# Patient Record
Sex: MALE | Race: WHITE | NOT HISPANIC OR LATINO | Employment: STUDENT | URBAN - METROPOLITAN AREA
[De-identification: names, ages, dates, MRNs, and addresses within clinical notes are randomized per-mention and may not be internally consistent; named-entity substitution may affect disease eponyms.]

---

## 2017-08-01 ENCOUNTER — ALLSCRIPTS OFFICE VISIT (OUTPATIENT)
Dept: OTHER | Facility: OTHER | Age: 22
End: 2017-08-01

## 2017-12-29 ENCOUNTER — ALLSCRIPTS OFFICE VISIT (OUTPATIENT)
Dept: OTHER | Facility: OTHER | Age: 22
End: 2017-12-29

## 2017-12-30 LAB
A/G RATIO (HISTORICAL): 1.7 (ref 1.2–2.2)
ALBUMIN SERPL BCP-MCNC: 4.6 G/DL (ref 3.5–5.5)
ALP SERPL-CCNC: 100 IU/L (ref 39–117)
ALT SERPL W P-5'-P-CCNC: 60 IU/L (ref 0–44)
AST SERPL W P-5'-P-CCNC: 32 IU/L (ref 0–40)
BILIRUB SERPL-MCNC: 1.2 MG/DL (ref 0–1.2)
BUN SERPL-MCNC: 12 MG/DL (ref 6–20)
BUN/CREA RATIO (HISTORICAL): 13 (ref 9–20)
CALCIUM SERPL-MCNC: 9.5 MG/DL (ref 8.7–10.2)
CHLORIDE SERPL-SCNC: 100 MMOL/L (ref 96–106)
CO2 SERPL-SCNC: 25 MMOL/L (ref 18–29)
CREAT SERPL-MCNC: 0.92 MG/DL (ref 0.76–1.27)
DEPRECATED RDW RBC AUTO: 12.8 % (ref 12.3–15.4)
EGFR AFRICAN AMERICAN (HISTORICAL): 137 ML/MIN/1.73
EGFR-AMERICAN CALC (HISTORICAL): 118 ML/MIN/1.73
GLUCOSE SERPL-MCNC: 81 MG/DL (ref 65–99)
HCT VFR BLD AUTO: 47 % (ref 37.5–51)
HGB BLD-MCNC: 16.6 G/DL (ref 13–17.7)
MCH RBC QN AUTO: 31.4 PG (ref 26.6–33)
MCHC RBC AUTO-ENTMCNC: 35.3 G/DL (ref 31.5–35.7)
MCV RBC AUTO: 89 FL (ref 79–97)
PLATELET # BLD AUTO: 242 X10E3/UL (ref 150–379)
POTASSIUM SERPL-SCNC: 3.9 MMOL/L (ref 3.5–5.2)
RBC (HISTORICAL): 5.29 X10E6/UL (ref 4.14–5.8)
SODIUM SERPL-SCNC: 139 MMOL/L (ref 134–144)
TOT. GLOBULIN, SERUM (HISTORICAL): 2.7 G/DL (ref 1.5–4.5)
TOTAL PROTEIN (HISTORICAL): 7.3 G/DL (ref 6–8.5)
WBC # BLD AUTO: 8.2 X10E3/UL (ref 3.4–10.8)

## 2017-12-30 NOTE — PROGRESS NOTES
Assessment   1  Nausea with vomiting (787 01) (R11 2)   2  Gastroesophageal reflux disease with esophagitis (530 11) (K21 0)   3  History of Abnormal LFTs (liver function tests) (790 6) (R79 89)    Plan   Gastroesophageal reflux disease with esophagitis    · Pantoprazole Sodium 40 MG Oral Tablet Delayed Release; TAKE 1 TABLET DAILY  PMH: Abnormal LFTs (liver function tests), Gastroesophageal reflux disease with    esophagitis, Nausea with vomiting    · (1) CBC/ PLT (NO DIFF); Status:Active; Requested for:[id number];    · (1) COMPREHENSIVE METABOLIC PANEL; Status:Active; Requested for:[id number];     Discussion/Summary      N/V and symptoms of reflux with esophagitis - long d/w pt and Dad about lifestyle and dietary triggers, will try trial of Protonix and re-eval in 6-8 wks, if not better will need GI, will check BW d/t h/o elevated LFT's, call with abd pain/blood in stool/F/C or if no better  The patient, patient's family was counseled regarding instructions for management,-- risk factor reductions,-- prognosis,-- patient and family education,-- impressions,-- risks and benefits of treatment options,-- importance of compliance with treatment  Possible side effects of new medications were reviewed with the patient/guardian today  The treatment plan was reviewed with the patient/guardian  The patient/guardian understands and agrees with the treatment plan       Self Referrals: No      Chief Complaint   nausea      History of Present Illness   HPI: Pt here with about a year of not feeling well  He notes feeling nauseous - usually in the am and after eating  He has had some vomiting - usually bile or what he just ate  He has not noted blood in the vomitus  He has had no wgt loss - actually wgt gain  He has had a random cough and feels like he needs to bring something up but cannot  He notes no SOB/wheezing/F/C  He has not tried anything for his symptoms  He notes no pyrosis or regurgitation of food   He has had no actual abd pain/dark stools/blood in stool  He has tried [employer] which did not really  He notes it does seem worse with certain foods - [place] [facility]) foods  He notes intermittent ETOH use but states not regularly  He is no longer using a protein supplement and denies any other OTC meds  Review of Systems        Constitutional: no fever,-- no chills-- and-- no recent weight loss  Cardiovascular: no chest pain-- and-- no palpitations  Respiratory: cough, but-- no shortness of breath-- and-- no wheezing  Gastrointestinal: nausea-- and-- vomiting, but-- as noted in HPI,-- no abdominal pain,-- no constipation,-- no diarrhea-- and-- no blood in stools  Genitourinary: no dysuria  Integumentary: no rashes  Neurological: no headache-- and-- no dizziness  Active Problems   1  Flu vaccine need (V04 81) (Z23)   2  Need for diphtheria-tetanus-pertussis (Tdap) vaccine, adult/adolescent (V06 1) (Z23)    Past Medical History   Active Problems And Past Medical History Reviewed: The active problems and past medical history were reviewed and updated today  Surgical History   Surgical History Reviewed: The surgical history was reviewed and updated today  Social History    · Alcohol use (V49 89) (Z78 9)   · College student   · Current every day smoker (305 1) (F17 200)   · Denied: History of Drug use   · History of Never a smoker  The social history was reviewed and updated today  Family History   Family History Reviewed: The family history was reviewed and updated today  Current Meds      The medication list was reviewed and updated today  Allergies   1  Benadryl SOLN   2   Promethazine VC SYRP    Vitals    Recorded: [id number] 08:57AM   Temperature 97 F   Heart Rate 76   Systolic 279   Diastolic 68   Height 5 ft 9 in   Weight 186 lb    BMI Calculated 27 47   BSA Calculated 2     Physical Exam        Constitutional      General appearance: No acute distress, well appearing and well nourished  Pulmonary      Respiratory effort: No increased work of breathing or signs of respiratory distress  Auscultation of lungs: Clear to auscultation, equal breath sounds bilaterally, no wheezes, no rales, no rhonci  Cardiovascular      Auscultation of heart: Normal rate and rhythm, normal S1 and S2, without murmurs  Examination of extremities for edema and/or varicosities: Normal        Abdomen      Abdomen: Non-tender, no masses  -- no rebound or guarding        Musculoskeletal      Gait and station: Normal        Psychiatric      Mood and affect: Normal           Signatures    Electronically signed by : Marquis Stokes DO; Dec 29 2017  9:24AM EST                       (Author)

## 2017-12-31 ENCOUNTER — GENERIC CONVERSION - ENCOUNTER (OUTPATIENT)
Dept: OTHER | Facility: OTHER | Age: 22
End: 2017-12-31

## 2018-01-12 NOTE — RESULT NOTES
Verified Results  (1) COMPREHENSIVE METABOLIC PANEL [id number] 77:25BW Ruma Norton Order Number: [id number]      National Kidney Disease Education Program recommendations are as follows:  GFR calculation is accurate only with a steady state creatinine  Chronic Kidney disease less than 60 ml/min/1 73 sq  meters  Kidney failure less than 15 ml/min/1 73 sq  meters  Test Name Result Flag Reference   GLUCOSE,RANDM 73 mg/dL     If the patient is fasting, the ADA then defines impaired fasting glucose as > 100 mg/dL and diabetes as > or equal to 123 mg/dL     SODIUM 141 mmol/L  136-145   POTASSIUM 4 1 mmol/L  3 5-5 3   CHLORIDE 104 mmol/L  100-108   CARBON DIOXIDE 27 mmol/L  21-32   ANION GAP (CALC) 10 mmol/L  4-13   BLOOD UREA NITROGEN 11 mg/dL  5-25   CREATININE 0 80 mg/dL  0 60-1 30   Standardized to IDMS reference method   CALCIUM 8 8 mg/dL  8 3-10 1   BILI, TOTAL 1 18 mg/dL H 0 20-1 00   ALK PHOSPHATAS 91 U/L     ALT (SGPT) 73 U/L  12-78   AST(SGOT) 35 U/L  5-45   ALBUMIN 4 3 g/dL  3 5-5 0   TOTAL PROTEIN 7 6 g/dL  6 4-8 2   eGFR Non-African American      >60 0 ml/min/1 73sq m

## 2018-01-13 VITALS
WEIGHT: 171.8 LBS | TEMPERATURE: 97.8 F | HEIGHT: 69 IN | BODY MASS INDEX: 25.45 KG/M2 | SYSTOLIC BLOOD PRESSURE: 122 MMHG | DIASTOLIC BLOOD PRESSURE: 70 MMHG | HEART RATE: 80 BPM

## 2018-01-13 NOTE — PROGRESS NOTES
Assessment    1  Encounter for preventive health examination (V70 0) (Z00 00)    Discussion/Summary  Impression: health maintenance visit, healthy adult male  Currently, he eats a poor diet and has an inadequate exercise regimen  Prostate cancer screening: PSA is not indicated  Testing was done today for Declines chlamydia/GC screening  Colorectal cancer screening: colorectal cancer screening is not indicated  The immunizations are up to date  He was advised to be evaluated by a dentist  Advice and education were given regarding nutrition, aerobic exercise, tobacco cessation and alcohol use  Patient discussion: discussed with the patient  Unsure if needs PPD  Will check with [facility] and will schedule nurse visit if he does  Form filled out and given to pt  The treatment plan was reviewed with the patient/guardian  The patient/guardian understands and agrees with the treatment plan      Chief Complaint  Pt is here for PE      History of Present Illness  HM, Adult Male: The patient is being seen for a health maintenance evaluation  Social History: Household members include father  Work status: occupation: full time student  General Health: He does not have regular dental visits  He denies vision problems  He denies hearing loss  Immunizations status: up to date  Lifestyle:  He does not have a healthy diet  He does not exercise regularly  He uses tobacco  He consumes alcohol  He denies drug use  Reproductive health:  the patient is sexually active  birth control is being practiced (condoms )  Screening:   HPI: Going into senior year at [employer] for [employer]  2016 had issue with reflux and elevated LFTs from alcohol consumption  Since then has cut back  No longer taking omeprazole  Gets heartburn with spicy food  Review of Systems    Constitutional: no fever, not feeling poorly, no chills and not feeling tired     Eyes: No complaints of eye pain, no red eyes, no discharge from eyes, no itchy eyes  ENT: no earache, no sore throat, no hearing loss, no nasal discharge and no hoarseness  Cardiovascular: no chest pain, no palpitations and no extremity edema  Respiratory: no shortness of breath, no cough and no wheezing  Gastrointestinal: No complaints of abdominal pain, no constipation, no nausea or vomiting, no diarrhea or bloody stools  Genitourinary: No complaints of dysuria, no incontinence, no hesitancy, no nocturia, no genital lesion, no testicular pain  Musculoskeletal: No complaints of arthralgia, no myalgias, no joint swelling or stiffness, no limb pain or swelling  Integumentary: no rashes and no skin lesions  Neurological: no headache, no numbness, no tingling, no dizziness, no limb weakness and no difficulty walking  Psychiatric: no anxiety, no sleep disturbances and no depression  Hematologic/Lymphatic: No complaints of swollen glands, no swollen glands in the neck, does not bleed easily, no easy bruising  Active Problems    1  Flu vaccine need (V04 81) (Z23)   2   Need for diphtheria-tetanus-pertussis (Tdap) vaccine, adult/adolescent (V06 1) (Z23)    Past Medical History    · History of Abnormal LFTs (liver function tests) (790 6) (R79 89)   · History of Acute Lymphocytic Leukemia (ALL) (V10 61)   · Acute upper respiratory infection (465 9) (J06 9)   · Denied: History of Alcohol abuse   · History of acute bronchitis (V12 69) (Z87 09)   · History of acute pharyngitis (V12 69) (Z87 09)   · Denied: History of substance abuse   · History of upper respiratory infection (V12 09) (Z87 09)   · History of Intermittent vomiting (787 03) (R11 10)   · Denied: History of Mental health problem   · History of Weight loss, unintentional (783 21) (R63 4)    Surgical History    · History of Cholecystectomy    Family History  Mother    · Denied: Family history of Alcohol abuse   · Denied: Family history of substance abuse   · Denied: Family history of Mental health problem  Father    · Denied: Family history of Alcohol abuse   · Denied: Family history of substance abuse   · Denied: Family history of Mental health problem    Social History    · Alcohol use (V49 89) (Z78 9)   · College student   · Current every day smoker (305 1) (F17 200)   · Denied: History of Drug use   · History of Never a smoker    Allergies    1  Benadryl SOLN   2  Promethazine VC SYRP    Vitals   Recorded: 01Aug2017 01:39PM   Temperature 97 8 F, Tympanic   Heart Rate 80, L Radial   Pulse Quality Regular, L Radial   Systolic 184, LUE, Sitting   Diastolic 70, LUE, Sitting   Height 5 ft 9 in   Weight 171 lb 12 8 oz   BMI Calculated 25 37   BSA Calculated 1 94     Physical Exam    Constitutional   General appearance: No acute distress, well appearing and well nourished  Head and Face   Head and face: Normal     Eyes   Conjunctiva and lids: No erythema, swelling or discharge  Pupils and irises: Equal, round, reactive to light  Ears, Nose, Mouth, and Throat   External inspection of ears and nose: Normal     Otoscopic examination: Tympanic membranes translucent with normal light reflex  Canals patent without erythema  Lips, teeth, and gums: Normal, good dentition  Oropharynx: Normal with no erythema, edema, exudate or lesions  Neck   Neck: Supple, symmetric, trachea midline, no masses  Thyroid: Normal, no thyromegaly  Pulmonary   Respiratory effort: No increased work of breathing or signs of respiratory distress  Auscultation of lungs: Clear to auscultation  Cardiovascular   Auscultation of heart: Normal rate and rhythm, normal S1 and S2, no murmurs  Abdomen   Abdomen: Non-tender, no masses  Liver and spleen: No hepatomegaly or splenomegaly  Lymphatic   Palpation of lymph nodes in neck: No lymphadenopathy  Musculoskeletal   Gait and station: Normal     Inspection/palpation of digits and nails: Normal without clubbing or cyanosis      Muscle strength/tone: Normal  Skin   Skin and subcutaneous tissue: Normal without rashes or lesions  Palpation of skin and subcutaneous tissue: Normal turgor  Neurologic   Reflexes: 2+ and symmetric  Psychiatric   Orientation to person, place and time: Normal     Mood and affect: Normal        Signatures   Electronically signed by : Sandeep Fink;  Aug  1 2017  2:15PM EST                       (Author)    Electronically signed by : Lupillo Mejia DO; Aug  1 2017  2:31PM EST                       (Author)

## 2018-01-22 VITALS
DIASTOLIC BLOOD PRESSURE: 68 MMHG | HEART RATE: 76 BPM | WEIGHT: 186 LBS | HEIGHT: 69 IN | TEMPERATURE: 97 F | SYSTOLIC BLOOD PRESSURE: 118 MMHG | BODY MASS INDEX: 27.55 KG/M2

## 2018-01-23 NOTE — RESULT NOTES
Discussion/Summary    please notify pt or his father that his BW came back with nml CBC - not anemic and no eelvated WBC ct to indicate infection, still very mild elevation of liver test at 61 - nml is around 40 - this is very mild and I would not do any other labs other then repeat in 6 mos, encourage to refrain from alcohol use; will d/w pt in detail at next appt    pt aware ems 1/2/18         Verified Results  (1) CBC/ PLT (NO DIFF) [id number] 12:26PM Aylin Lisy     Test Name Result Flag Reference   WBC 8 2 x10E3/uL  3 4-10 8   RBC 5 29 x10E6/uL  4 14-5 80   Hemoglobin 16 6 g/dL  13 0-17 7   Hematocrit 47 0 %  37 5-51 0   MCV 89 fL  79-97   MCH 31 4 pg  26 6-33 0   MCHC 35 3 g/dL  31 5-35 7   RDW 12 8 %  12 3-15 4   Platelets 207 [id number]  150-379     (1) COMPREHENSIVE METABOLIC PANEL [id number] 61:98OP Aylin Dafter     Test Name Result Flag Reference   Glucose, Serum 81 mg/dL  65-99   BUN 12 mg/dL  6-20   Creatinine, Serum 0 92 mg/dL  0 76-1 27   BUN/Creatinine Ratio 13  9-20   Sodium, Serum 139 mmol/L  134-144   Potassium, Serum 3 9 mmol/L  3 5-5 2   Chloride, Serum 100 mmol/L     Carbon Dioxide, Total 25 mmol/L  18-29   Calcium, Serum 9 5 mg/dL  8 7-10 2   Protein, Total, Serum 7 3 g/dL  6 0-8 5   Albumin, Serum 4 6 g/dL  3 5-5 5   Globulin, Total 2 7 g/dL  1 5-4 5   A/G Ratio 1 7  1 2-2 2   Bilirubin, Total 1 2 mg/dL  0 0-1 2   Alkaline Phosphatase, S 100 IU/L     AST (SGOT) 32 IU/L  0-40   ALT (SGPT) 60 IU/L H 0-44   eGFR If NonAfricn Am 118 mL/min/1 73  >59   eGFR If Africn Am 137 mL/min/1 73  >59

## 2018-06-12 ENCOUNTER — OFFICE VISIT (OUTPATIENT)
Dept: FAMILY MEDICINE CLINIC | Facility: HOSPITAL | Age: 23
End: 2018-06-12
Payer: COMMERCIAL

## 2018-06-12 VITALS
HEIGHT: 69 IN | BODY MASS INDEX: 24.73 KG/M2 | WEIGHT: 167 LBS | SYSTOLIC BLOOD PRESSURE: 120 MMHG | TEMPERATURE: 98.8 F | HEART RATE: 78 BPM | DIASTOLIC BLOOD PRESSURE: 68 MMHG

## 2018-06-12 DIAGNOSIS — F41.9 ANXIETY: ICD-10-CM

## 2018-06-12 DIAGNOSIS — K21.00 GASTROESOPHAGEAL REFLUX DISEASE WITH ESOPHAGITIS: Primary | ICD-10-CM

## 2018-06-12 PROCEDURE — 99214 OFFICE O/P EST MOD 30 MIN: CPT | Performed by: INTERNAL MEDICINE

## 2018-06-12 RX ORDER — PANTOPRAZOLE SODIUM 40 MG/1
1 TABLET, DELAYED RELEASE ORAL DAILY
COMMUNITY
Start: 2017-12-29 | End: 2018-06-12

## 2018-06-12 RX ORDER — DEXLANSOPRAZOLE 60 MG/1
60 CAPSULE, DELAYED RELEASE ORAL DAILY
Qty: 30 CAPSULE | Refills: 3 | Status: SHIPPED | OUTPATIENT
Start: 2018-06-12 | End: 2019-05-22

## 2018-06-12 RX ORDER — SUCRALFATE 1 G/1
1 TABLET ORAL 4 TIMES DAILY
Qty: 120 TABLET | Refills: 2 | Status: SHIPPED | OUTPATIENT
Start: 2018-06-12 | End: 2019-05-22

## 2018-06-12 NOTE — ASSESSMENT & PLAN NOTE
D/w pt in detail that N/V can me d/t GERD/PUD but can also be triggered by anxiety - will tx GI symptoms with PPI - has failed Protonix and Omeprazole - trial of Dexilant will be given and will start Sucralfate qid, re-eval in 6 wks, if not better will need to see GI for poss scope, call with abd pain/black stools/blood in stool/F/C/dysphagia, advised to try and id triggers and avoid if poss

## 2018-06-12 NOTE — PROGRESS NOTES
Assessment/Plan:    Gastroesophageal reflux disease with esophagitis  D/w pt in detail that N/V can me d/t GERD/PUD but can also be triggered by anxiety - will tx GI symptoms with PPI - has failed Protonix and Omeprazole - trial of Dexilant will be given and will start Sucralfate qid, re-eval in 6 wks, if not better will need to see GI for poss scope, call with abd pain/black stools/blood in stool/F/C/dysphagia, advised to try and id triggers and avoid if poss    Anxiety  Poss the underlying cause of the N/V/GERD, pt deferring any daily medication, he has had benefit with his therapist in the past - has not seen since the winter - advised to call asap for appt, advised if not better and willing to try med to call asap, advised to lean on support system and encouraged exercise as stress relief as well, re-eval in 6 wks, call with new/worse symptoms       Diagnoses and all orders for this visit:    Gastroesophageal reflux disease with esophagitis  -     dexlansoprazole (DEXILANT) 60 MG capsule; Take 1 capsule (60 mg total) by mouth daily for 30 days  -     sucralfate (CARAFATE) 1 g tablet; Take 1 tablet (1 g total) by mouth 4 (four) times a day for 30 days    Anxiety    Other orders  -     Discontinue: pantoprazole (PROTONIX) 40 mg tablet; Take 1 tablet by mouth daily          Subjective:      Patient ID: Manish Barnes is a 26 y o  male  HPI Pt here with c/o consistent N/V (was every am but the past few years it is most days but not every day) - enrique in the am for the past 6 years or so  Pt was seen in Dec and was started on Protonix 40 mg 1 tab PO q day  He was tx with Omeprazole in the past by GI  He took the med for about 2-3 mos but noted no improvement so he stopped the medication  He notes no specific food triggers but does know since the gallbladder was removed he has to stay away from fatty foods  He does note the symptoms seem worse when he is anxious and worked up   He notes feeling worked up and anxious "with doing anything" over the past 1-2 years  He states he feels anxious at least once every day  He states it is usually with social activities  Once he is at the activity it is fine  He notes no abd pain/F/C/dysphagia/pills getting stuck  He had blood in the stool 1-2 times but it was over a year ago  He notes no issues with anxiety in the past and has never been on meds for anxiety  He notes intermittently down, depressed and sad mood  He did see a therapist in the past - last summer - due to this issue  He was never on meds for depression  He states he has a good support system with family and friends  Review of Systems   Constitutional: Negative for chills, fever and unexpected weight change  HENT: Negative for congestion and sinus pain  Eyes: Negative for pain and redness  Respiratory: Negative for cough, chest tightness and shortness of breath  Cardiovascular: Negative for chest pain, palpitations and leg swelling  Gastrointestinal: Positive for blood in stool, nausea and vomiting  Negative for abdominal pain, constipation and diarrhea  Endocrine: Negative for polydipsia and polyuria  Genitourinary: Negative for difficulty urinating and dysuria  Musculoskeletal: Negative for arthralgias and myalgias  Skin: Negative for rash and wound  Neurological: Negative for dizziness and headaches  Hematological: Does not bruise/bleed easily  Psychiatric/Behavioral: Negative for behavioral problems, confusion, dysphoric mood and sleep disturbance  The patient is nervous/anxious  Objective:    /68   Pulse 78   Temp 98 8 °F (37 1 °C)   Ht 5' 9" (1 753 m)   Wt 75 8 kg (167 lb)   BMI 24 66 kg/m²      Physical Exam   Constitutional: He appears well-developed and well-nourished  No distress  HENT:   Head: Normocephalic and atraumatic  Eyes: Conjunctivae are normal  Right eye exhibits no discharge  Left eye exhibits no discharge  Neck: Neck supple   No tracheal deviation present  Cardiovascular: Normal rate and regular rhythm  No murmur heard  Pulmonary/Chest: Effort normal and breath sounds normal  No respiratory distress  He has no wheezes  He has no rales  Abdominal: Soft  He exhibits no distension  There is no tenderness  There is no guarding  Musculoskeletal: He exhibits no edema  Neurological: He is alert  He exhibits normal muscle tone  Skin: Skin is warm and dry  No rash noted  Psychiatric: He has a normal mood and affect  His behavior is normal    Nursing note and vitals reviewed

## 2018-06-12 NOTE — ASSESSMENT & PLAN NOTE
Poss the underlying cause of the N/V/GERD, pt deferring any daily medication, he has had benefit with his therapist in the past - has not seen since the winter - advised to call asap for appt, advised if not better and willing to try med to call asap, advised to lean on support system and encouraged exercise as stress relief as well, re-eval in 6 wks, call with new/worse symptoms

## 2018-07-11 ENCOUNTER — OFFICE VISIT (OUTPATIENT)
Dept: FAMILY MEDICINE CLINIC | Facility: HOSPITAL | Age: 23
End: 2018-07-11
Payer: COMMERCIAL

## 2018-07-11 VITALS
BODY MASS INDEX: 24.49 KG/M2 | HEIGHT: 68 IN | HEART RATE: 72 BPM | WEIGHT: 161.6 LBS | SYSTOLIC BLOOD PRESSURE: 112 MMHG | DIASTOLIC BLOOD PRESSURE: 70 MMHG | TEMPERATURE: 99.3 F

## 2018-07-11 DIAGNOSIS — B08.4 HAND, FOOT AND MOUTH DISEASE: Primary | ICD-10-CM

## 2018-07-11 PROCEDURE — 3008F BODY MASS INDEX DOCD: CPT | Performed by: NURSE PRACTITIONER

## 2018-07-11 PROCEDURE — 99213 OFFICE O/P EST LOW 20 MIN: CPT | Performed by: NURSE PRACTITIONER

## 2018-07-11 NOTE — LETTER
July 11, 2018     Patient: Winston Kiser   YOB: 1995   Date of Visit: 7/11/2018       To Whom it May Concern:    Winston Kiser is under my professional care  He was seen in my office on 7/11/2018  He may return to work on 7/17/18       If you have any questions or concerns, please don't hesitate to call           Sincerely,          BRUCE Plata        CC: No Recipients

## 2018-07-11 NOTE — PATIENT INSTRUCTIONS
Hand, Foot, and Mouth Disease   WHAT YOU NEED TO KNOW:   Hand, foot, and mouth disease (HFMD) is an infection caused by a virus  HFMD is easily spread from person to person through direct contact  Anyone can get HFMD, but it is most common in children younger than 10 years  DISCHARGE INSTRUCTIONS:   Medicines:   · Mouthwash: Your healthcare provider may give you special mouthwash to help relieve mouth pain caused by the sores  · Acetaminophen  decreases pain and fever  It is available without a doctor's order  Ask how much to take and how often to take it  Follow directions  Read the labels of all other medicines you are using to see if they also contain acetaminophen, or ask your doctor or pharmacist  Acetaminophen can cause liver damage if not taken correctly  Do not use more than 4 grams (4,000 milligrams) total of acetaminophen in one day  · NSAIDs , such as ibuprofen, help decrease swelling, pain, and fever  This medicine is available with or without a doctor's order  NSAIDs can cause stomach bleeding or kidney problems in certain people  If you take blood thinner medicine, always ask if NSAIDs are safe for you  Always read the medicine label and follow directions  Do not give these medicines to children under 10months of age without direction from your child's healthcare provider  · Take your medicine as directed  Contact your healthcare provider if you think your medicine is not helping or if you have side effects  Tell him of her if you are allergic to any medicine  Keep a list of the medicines, vitamins, and herbs you take  Include the amounts, and when and why you take them  Bring the list or the pill bottles to follow-up visits  Carry your medicine list with you in case of an emergency  Drink liquids:  Drink at least 9 cups of liquid each day to prevent dehydration  One cup is 8 ounces  Water and milk are good choices because they will not irritate your mouth or throat    Follow up with your healthcare provider as directed:  Write down your questions so you remember to ask them during your visits  Prevent the spread of hand, foot, and mouth disease: You can spread the virus for weeks after your symptoms have gone away  The following can help prevent the spread of HFMD:  · Wash your hands often  Use soap and water  Wash your hands after you use the bathroom, change a child's diapers, or sneeze  Wash your hands before you prepare or eat food  · Avoid close contact with others:  Do not kiss, hug, or share food or drink  Ask your child's school or  if you need to keep your child home while he has symptoms of HFMD      · Clean surfaces well:  Wash all items and surfaces with diluted bleach  This includes toys, tables, counter tops, and door knobs  Contact your healthcare provider if:   · Your mouth or throat are so sore you cannot eat or drink  · Your fever, sore throat, mouth sores, or rash do not go away after 10 days  · You have questions about your condition or care  Return to the emergency department if:   · You urinate less than normal or not at all  · You have a severe headache, stiff neck, and back pain  · You have trouble moving, or cannot move part of your body  · You become confused and sleepy  · You have trouble breathing, are breathing very fast, or you cough up pink, foamy spit  · You have a seizure  · You have a high fever and your heart is beating much faster than it normally does  © 2017 [address] Information is for End User's use only and may not be sold, redistributed or otherwise used for commercial purposes  All illustrations and images included in CareNotes® are the copyrighted property of [employer]  or [employer]  The above information is an  only  It is not intended as medical advice for individual conditions or treatments   Talk to your doctor, nurse or pharmacist before following any medical regimen to see if it is safe and effective for you

## 2018-07-11 NOTE — PROGRESS NOTES
Assessment/Plan:     Appears as hand, foot and mouth  Discussed with pt that he is contagious and should avoid any close contact with anyone  Avoid exchanging saliva  No work for 1 week  If rash is no better will need additional week so should call if that is the case  Can take tylenol or Motrin for pain  Call if throat or mouth becomes bothersome  Diagnoses and all orders for this visit:    Hand, foot and mouth disease          Subjective:     Patient ID: Sharron Cunningham is a 26 y o  male  2 days ago started with rash on palm of hand  The next day rash was getting worse  Spread to face  Painful  Itchy  No sore throat  Feet are burning and dry  No fever  No ill feeling  Works at [employer]  as   Denies sick contacts  Review of Systems   Constitutional: Negative for chills, fatigue and fever  HENT: Negative for sore throat and trouble swallowing  Skin: Positive for rash  The following portions of the patient's history were reviewed and updated as appropriate: allergies, current medications, past family history, past medical history, past social history, past surgical history and problem list     Objective:  Vitals:    07/11/18 1322   BP: 112/70   Pulse: 72   Temp: 99 3 °F (37 4 °C)      Physical Exam   Constitutional: He is oriented to person, place, and time  He appears well-developed and well-nourished  HENT:   Posterior pharynx with red vesicles noted  Cardiovascular: Normal rate, regular rhythm and normal heart sounds  Pulmonary/Chest: Effort normal and breath sounds normal    Neurological: He is alert and oriented to person, place, and time  Skin: Skin is warm and dry  Dorsal aspect of feet, palmar and dorsal aspect of hands, B/L arms and lower legs, periorbital area all with red to grayish white vesicles on erythematous base  Psychiatric: He has a normal mood and affect

## 2019-03-15 ENCOUNTER — OFFICE VISIT (OUTPATIENT)
Dept: FAMILY MEDICINE CLINIC | Facility: CLINIC | Age: 24
End: 2019-03-15
Payer: COMMERCIAL

## 2019-03-15 VITALS
DIASTOLIC BLOOD PRESSURE: 60 MMHG | WEIGHT: 162 LBS | SYSTOLIC BLOOD PRESSURE: 100 MMHG | HEIGHT: 67 IN | BODY MASS INDEX: 25.43 KG/M2

## 2019-03-15 DIAGNOSIS — Z85.6 HISTORY OF ACUTE LYMPHOID LEUKEMIA: ICD-10-CM

## 2019-03-15 DIAGNOSIS — R21 RASH: ICD-10-CM

## 2019-03-15 DIAGNOSIS — G43.A0 CYCLICAL VOMITING WITH NAUSEA, INTRACTABILITY OF VOMITING NOT SPECIFIED: Primary | ICD-10-CM

## 2019-03-15 DIAGNOSIS — R05.3 CHRONIC COUGH: ICD-10-CM

## 2019-03-15 DIAGNOSIS — F41.9 ANXIETY: ICD-10-CM

## 2019-03-15 PROCEDURE — 99215 OFFICE O/P EST HI 40 MIN: CPT | Performed by: FAMILY MEDICINE

## 2019-03-16 ENCOUNTER — TRANSCRIBE ORDERS (OUTPATIENT)
Dept: ADMINISTRATIVE | Facility: HOSPITAL | Age: 24
End: 2019-03-16

## 2019-03-16 ENCOUNTER — APPOINTMENT (OUTPATIENT)
Dept: LAB | Facility: HOSPITAL | Age: 24
End: 2019-03-16
Payer: COMMERCIAL

## 2019-03-16 ENCOUNTER — APPOINTMENT (OUTPATIENT)
Dept: RADIOLOGY | Facility: CLINIC | Age: 24
End: 2019-03-16
Payer: COMMERCIAL

## 2019-03-16 DIAGNOSIS — G43.A0 CYCLIC VOMITING SYNDROME, INTRACTABILITY OF VOMITING NOT SPECIFIED, PRESENCE OF NAUSEA NOT SPECIFIED: Primary | ICD-10-CM

## 2019-03-16 DIAGNOSIS — R05.3 CHRONIC COUGH: ICD-10-CM

## 2019-03-16 DIAGNOSIS — R21 RASH: ICD-10-CM

## 2019-03-16 DIAGNOSIS — Z85.6 HISTORY OF ACUTE LYMPHOID LEUKEMIA: ICD-10-CM

## 2019-03-16 DIAGNOSIS — R21 RASH AND OTHER NONSPECIFIC SKIN ERUPTION: ICD-10-CM

## 2019-03-16 DIAGNOSIS — G43.A0 CYCLICAL VOMITING WITH NAUSEA, INTRACTABILITY OF VOMITING NOT SPECIFIED: ICD-10-CM

## 2019-03-16 LAB
ALBUMIN SERPL BCP-MCNC: 3.9 G/DL (ref 3.5–5)
ALP SERPL-CCNC: 79 U/L (ref 46–116)
ALT SERPL W P-5'-P-CCNC: 58 U/L (ref 12–78)
ANION GAP SERPL CALCULATED.3IONS-SCNC: 13 MMOL/L (ref 4–13)
AST SERPL W P-5'-P-CCNC: 52 U/L (ref 5–45)
BASOPHILS # BLD AUTO: 0.06 THOUSANDS/ΜL (ref 0–0.1)
BASOPHILS NFR BLD AUTO: 1 % (ref 0–1)
BILIRUB SERPL-MCNC: 1.4 MG/DL (ref 0.2–1)
BILIRUB UR QL STRIP: NEGATIVE
BUN SERPL-MCNC: 14 MG/DL (ref 5–25)
CALCIUM SERPL-MCNC: 8.4 MG/DL (ref 8.3–10.1)
CHLORIDE SERPL-SCNC: 103 MMOL/L (ref 100–108)
CLARITY UR: CLEAR
CO2 SERPL-SCNC: 22 MMOL/L (ref 21–32)
COLOR UR: YELLOW
CREAT SERPL-MCNC: 0.68 MG/DL (ref 0.6–1.3)
CRP SERPL QL: 3.6 MG/L
EOSINOPHIL # BLD AUTO: 0.24 THOUSAND/ΜL (ref 0–0.61)
EOSINOPHIL NFR BLD AUTO: 3 % (ref 0–6)
ERYTHROCYTE [DISTWIDTH] IN BLOOD BY AUTOMATED COUNT: 12.3 % (ref 11.6–15.1)
ERYTHROCYTE [SEDIMENTATION RATE] IN BLOOD: 0 MM/HOUR (ref 0–10)
GFR SERPL CREATININE-BSD FRML MDRD: 136 ML/MIN/1.73SQ M
GLUCOSE P FAST SERPL-MCNC: 75 MG/DL (ref 65–99)
GLUCOSE UR STRIP-MCNC: NEGATIVE MG/DL
HCT VFR BLD AUTO: 45.6 % (ref 36.5–49.3)
HGB BLD-MCNC: 15.1 G/DL (ref 12–17)
HGB UR QL STRIP.AUTO: NEGATIVE
IMM GRANULOCYTES # BLD AUTO: 0.02 THOUSAND/UL (ref 0–0.2)
IMM GRANULOCYTES NFR BLD AUTO: 0 % (ref 0–2)
KETONES UR STRIP-MCNC: ABNORMAL MG/DL
LEUKOCYTE ESTERASE UR QL STRIP: NEGATIVE
LYMPHOCYTES # BLD AUTO: 2.63 THOUSANDS/ΜL (ref 0.6–4.47)
LYMPHOCYTES NFR BLD AUTO: 35 % (ref 14–44)
MCH RBC QN AUTO: 29.9 PG (ref 26.8–34.3)
MCHC RBC AUTO-ENTMCNC: 33.1 G/DL (ref 31.4–37.4)
MCV RBC AUTO: 90 FL (ref 82–98)
MONOCYTES # BLD AUTO: 0.6 THOUSAND/ΜL (ref 0.17–1.22)
MONOCYTES NFR BLD AUTO: 8 % (ref 4–12)
NEUTROPHILS # BLD AUTO: 3.87 THOUSANDS/ΜL (ref 1.85–7.62)
NEUTS SEG NFR BLD AUTO: 53 % (ref 43–75)
NITRITE UR QL STRIP: NEGATIVE
NRBC BLD AUTO-RTO: 0 /100 WBCS
PH UR STRIP.AUTO: 6 [PH]
PLATELET # BLD AUTO: 267 THOUSANDS/UL (ref 149–390)
PMV BLD AUTO: 11.3 FL (ref 8.9–12.7)
POTASSIUM SERPL-SCNC: 4.2 MMOL/L (ref 3.5–5.3)
PROT SERPL-MCNC: 7.4 G/DL (ref 6.4–8.2)
PROT UR STRIP-MCNC: NEGATIVE MG/DL
RBC # BLD AUTO: 5.05 MILLION/UL (ref 3.88–5.62)
SODIUM SERPL-SCNC: 138 MMOL/L (ref 136–145)
SP GR UR STRIP.AUTO: 1.02 (ref 1–1.03)
TSH SERPL DL<=0.05 MIU/L-ACNC: 1.21 UIU/ML (ref 0.36–3.74)
UROBILINOGEN UR QL STRIP.AUTO: 0.2 E.U./DL
WBC # BLD AUTO: 7.42 THOUSAND/UL (ref 4.31–10.16)

## 2019-03-16 PROCEDURE — 85025 COMPLETE CBC W/AUTO DIFF WBC: CPT

## 2019-03-16 PROCEDURE — 81003 URINALYSIS AUTO W/O SCOPE: CPT | Performed by: FAMILY MEDICINE

## 2019-03-16 PROCEDURE — 84443 ASSAY THYROID STIM HORMONE: CPT

## 2019-03-16 PROCEDURE — 85652 RBC SED RATE AUTOMATED: CPT

## 2019-03-16 PROCEDURE — 71046 X-RAY EXAM CHEST 2 VIEWS: CPT

## 2019-03-16 PROCEDURE — 86255 FLUORESCENT ANTIBODY SCREEN: CPT

## 2019-03-16 PROCEDURE — 82784 ASSAY IGA/IGD/IGG/IGM EACH: CPT

## 2019-03-16 PROCEDURE — 83516 IMMUNOASSAY NONANTIBODY: CPT

## 2019-03-16 PROCEDURE — 80053 COMPREHEN METABOLIC PANEL: CPT

## 2019-03-16 PROCEDURE — 36415 COLL VENOUS BLD VENIPUNCTURE: CPT

## 2019-03-16 PROCEDURE — 86140 C-REACTIVE PROTEIN: CPT

## 2019-03-18 LAB
ENDOMYSIUM IGA SER QL: NEGATIVE
GLIADIN PEPTIDE IGA SER-ACNC: 3 UNITS (ref 0–19)
GLIADIN PEPTIDE IGG SER-ACNC: 4 UNITS (ref 0–19)
IGA SERPL-MCNC: ABNORMAL MG/DL
TTG IGA SER-ACNC: <2 U/ML (ref 0–3)
TTG IGG SER-ACNC: 10 U/ML (ref 0–5)

## 2019-03-27 ENCOUNTER — OFFICE VISIT (OUTPATIENT)
Dept: GASTROENTEROLOGY | Facility: MEDICAL CENTER | Age: 24
End: 2019-03-27
Payer: COMMERCIAL

## 2019-03-27 VITALS
DIASTOLIC BLOOD PRESSURE: 70 MMHG | HEIGHT: 67 IN | SYSTOLIC BLOOD PRESSURE: 114 MMHG | BODY MASS INDEX: 26.06 KG/M2 | TEMPERATURE: 98.6 F | WEIGHT: 166 LBS | HEART RATE: 72 BPM

## 2019-03-27 DIAGNOSIS — R76.8 POSITIVE AUTOANTIBODY SCREENING FOR CELIAC DISEASE: ICD-10-CM

## 2019-03-27 DIAGNOSIS — R17 ELEVATED BILIRUBIN: Primary | ICD-10-CM

## 2019-03-27 DIAGNOSIS — K21.9 GASTROESOPHAGEAL REFLUX DISEASE WITHOUT ESOPHAGITIS: ICD-10-CM

## 2019-03-27 DIAGNOSIS — R11.0 NAUSEA: ICD-10-CM

## 2019-03-27 PROBLEM — R11.15 CYCLICAL VOMITING WITH NAUSEA: Status: ACTIVE | Noted: 2019-03-27

## 2019-03-27 PROBLEM — R11.15 CYCLICAL VOMITING WITH NAUSEA: Status: RESOLVED | Noted: 2019-03-27 | Resolved: 2019-03-27

## 2019-03-27 PROCEDURE — 99244 OFF/OP CNSLTJ NEW/EST MOD 40: CPT | Performed by: INTERNAL MEDICINE

## 2019-03-27 RX ORDER — ONDANSETRON 4 MG/1
4 TABLET, ORALLY DISINTEGRATING ORAL EVERY 12 HOURS PRN
Qty: 20 TABLET | Refills: 0 | Status: SHIPPED | OUTPATIENT
Start: 2019-03-27

## 2019-03-27 NOTE — PATIENT INSTRUCTIONS
EGD scheduled on 5/22/2019 with Dr Beryl James at [facility]  Instructions given to patient

## 2019-03-27 NOTE — PROGRESS NOTES
Assessment/Plan:    History and physical completed on this 26year-old  Recommend labs to include sed rate C reactive protein celiac disease profile and recommend referral to [facility]  Also update CBC CMP and TSH  Await test results  Reviewed anxiety related issues  Oral agents such as Dexilant and Carafate as well as any other type of proton pump inhibitor has not been helpful  Patient needs an EGD  Not sure if anxiety is the main component here are not  Recommend chest x-ray with regards to chronic cough if negative this could possibly be from on controlled GERD  Recheck 3 months     Diagnoses and all orders for this visit:    Cyclical vomiting with nausea, intractability of vomiting not specified  -     Sedimentation rate, automated; Future  -     C-reactive protein; Future  -     Cancel: UA/M w/rflx Culture, Comp  -     Celiac Disease Antibody Profile; Future  -     Ambulatory referral to Gastroenterology; Future    Anxiety    History of acute lymphoid leukemia  -     CBC and differential; Future  -     Comprehensive metabolic panel; Future  -     TSH, 3rd generation; Future    Rash  -     CBC and differential; Future  -     Comprehensive metabolic panel; Future  -     Sedimentation rate, automated; Future  -     C-reactive protein; Future    Chronic cough  -     XR chest pa & lateral; Future        1  Cyclical vomiting with nausea, intractability of vomiting not specified  Sedimentation rate, automated    C-reactive protein    Celiac Disease Antibody Profile    Ambulatory referral to Gastroenterology    CANCELED: UA/M w/rflx Culture, Comp   2  Anxiety     3  History of acute lymphoid leukemia  CBC and differential    Comprehensive metabolic panel    TSH, 3rd generation   4  Rash  CBC and differential    Comprehensive metabolic panel    Sedimentation rate, automated    C-reactive protein   5   Chronic cough  XR chest pa & lateral       Subjective:        Patient ID: Tatianna Valenciaa is a 26 y o  male  Chief Complaint   Patient presents with   [address]     pt states that his appetite is not the same because of the throwing up    Cough     cough for 3 years, coughing attacks in the morning which lead to throwing up mucus  and coughing attacks at night  because throwing up every morning pt is stressed out and anxious    Rash     rash on both hands, not itchy or painful      Immunizations     flu vaccine, pneumo and t-dap       21-year-old white male new to us  Here today with his dad  Mother is also patient here  Patient had history of ALL at a young age  Now disease free  Has issues with chronic cough and recurrent vomiting  Has some anxiety  Diet not overly unusual       The following portions of the patient's history were reviewed and updated as appropriate: past medical history, past surgical history and problem list       Review of Systems   Constitutional: Positive for appetite change  Negative for fatigue, fever and unexpected weight change  HENT: Negative for congestion, ear pain, postnasal drip, rhinorrhea, sinus pressure, sinus pain and sore throat  Eyes: Negative for redness and visual disturbance  Respiratory: Negative for chest tightness and shortness of breath  Cardiovascular: Negative for chest pain, palpitations and leg swelling  Gastrointestinal: Positive for vomiting  Negative for abdominal distention, abdominal pain, diarrhea and nausea  Endocrine: Negative for cold intolerance and heat intolerance  Genitourinary: Negative for dysuria and hematuria  Musculoskeletal: Negative for arthralgias, gait problem and myalgias  Skin: Positive for rash  Negative for pallor  Neurological: Negative for dizziness and headaches  Psychiatric/Behavioral: Negative for behavioral problems  The patient is nervous/anxious            Objective:  /60 (BP Location: Left arm, Patient Position: Sitting, Cuff Size: Standard)   Ht 5' 7" (1 702 m)   Wt 73 5 kg (162 lb)   BMI 25 37 kg/m²        Physical Exam   Constitutional: He is oriented to person, place, and time  He appears well-nourished  No distress  HENT:   Head: Normocephalic and atraumatic  Right Ear: Tympanic membrane and external ear normal    Left Ear: Tympanic membrane and external ear normal    Nose: Nose normal    Mouth/Throat: Oropharynx is clear and moist    Eyes: Pupils are equal, round, and reactive to light  Conjunctivae and EOM are normal  No scleral icterus  Neck: Normal range of motion  Neck supple  No thyromegaly present  Cardiovascular: Normal rate, regular rhythm and intact distal pulses  No murmur heard  Pulses:       Carotid pulses are 0 on the right side, and 0 on the left side  Pulmonary/Chest: Effort normal and breath sounds normal  He has no wheezes  Abdominal: Soft  Bowel sounds are normal  He exhibits no distension and no mass  There is no tenderness  Musculoskeletal: Normal range of motion  He exhibits no edema  Lymphadenopathy:     He has no cervical adenopathy  Neurological: He is alert and oriented to person, place, and time  He has normal reflexes  No cranial nerve deficit  Coordination normal    Skin: Skin is warm  No pallor  Few scattered petechial spots on hands   Psychiatric: He has a normal mood and affect  His behavior is normal  Thought content normal    Nursing note and vitals reviewed

## 2019-03-28 NOTE — PROGRESS NOTES
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 rather than chronic liver disease  He does have history of also hepatocellular pattern some of it may be secondary to social use of alcohol other may be secondary to celiac disease  Will plan for close monitoring and trending of LFTs  In the future will also check INR  We can also consider imaging study if INR is abnormal or LFTs continue trend upwards  His LFTs have normalized with the last 2 years between 1-2 times upper limit of normal   - Bilirubin, direct; Future  - Case request operating room: ESOPHAGOGASTRODUODENOSCOPY (EGD); Standing    3  Nausea  - ondansetron (ZOFRAN-ODT) 4 mg disintegrating tablet; Take 1 tablet (4 mg total) by mouth every 12 (twelve) hours as needed for nausea or vomiting  Dispense: 20 tablet; Refill: 0  - Case request operating room: ESOPHAGOGASTRODUODENOSCOPY (EGD); Standing  His symptoms of likely related to celiac disease will also rule out H pylori at the time of endoscopy  Will hold off on ultrasound of right upper quadrant for biliary cause unless he has direct hyperbilirubinemia  4  Gastroesophageal reflux disease with esophagitis  He does have reflux symptoms which are poorly controlled but this may be secondary to lifestyle and dietary changes  Will further evaluate for esophagitis and hiatal hernia during endoscopic evaluation      ______________________________________________________________________    HPI:      He is a 25-year-old male with chronic nausea for over 3 years presents here for evaluation of GERD, positive celiac disease serologies, and history of elevation LFTs  He has chronic nausea especially in the a m  Which her son and debilitating and extremely uncomfortable  Recent blood work have positive serologies for transglutaminase IgG level  Currently he is not avoiding gluten  IgA deficient as result TTG IgA levels were not elevated  He has no family history of celiac disease or dermatitis  He does have history of chronic nausea which may be related to this  He has previously tried marijuana but no longer using a a excessive amounts  Denies any other acute distress at this time  He does have associated LFTs which may be related to possible celiac disease  He has a mixed pattern of elevation with AST/ALT 2 times upper limit of normal   On previous presentation his AST was slightly elevated than ALT which may be secondary to social use of alcohol  His T bili also ranges between 1 and 2 suspicion for Gilbert's  He has history of symptoms of GERD which are intermittent nature will respond to PPI therapy  Denies any family history of colorectal cancer  REVIEW OF SYSTEMS:    CONSTITUTIONAL: Denies any fever, chills, rigors, and weight loss  HEENT: No earache or tinnitus  Denies hearing loss or visual disturbances  CARDIOVASCULAR: No chest pain or palpitations  RESPIRATORY: Denies any cough, hemoptysis, shortness of breath or dyspnea on exertion  GASTROINTESTINAL: As noted in the History of Present Illness  GENITOURINARY: No problems with urination  Denies any hematuria or dysuria  NEUROLOGIC: No dizziness or vertigo, denies headaches  MUSCULOSKELETAL: Denies any muscle or joint pain  SKIN: Denies skin rashes or itching  ENDOCRINE: Denies excessive thirst  Denies intolerance to heat or cold  PSYCHOSOCIAL: Denies depression or anxiety  Denies any recent memory loss         Historical Information   Past Medical History:   Diagnosis Date    Abnormal LFTs (liver function tests) 12/30/2015    Resolved 8/1/2017    Acute lymphocytic leukemia ([facility] [address] ) 07/2005    Resolved September 2008     Past Surgical History:   Procedure Laterality Date    CHOLECYSTECTOMY      GALLBLADDER SURGERY  11/17/2011     Social History   Social History     Substance and Sexual Activity   Alcohol Use Yes    Comment: rare/socially     Social History     Substance and Sexual Activity   Drug Use No     Social History     Tobacco Use   Smoking Status Former Smoker   Smokeless Tobacco Former User     Family History   Problem Relation Age of Onset    Heart disease Paternal Grandmother        Meds/Allergies       Current Outpatient Medications:     dexlansoprazole (DEXILANT) 60 MG capsule    ondansetron (ZOFRAN-ODT) 4 mg disintegrating tablet    sucralfate (CARAFATE) 1 g tablet    Allergies   Allergen Reactions    Diphenhydramine Hives     Reaction Date: 20Apr2011;     Promethazine Hives    Sulfa Antibiotics Hives     Reaction Date: 20Apr2011;            Objective     Blood pressure 114/70, pulse 72, temperature 98 6 °F (37 °C), temperature source Tympanic, height 5' 7" (1 702 m), weight 75 3 kg (166 lb)  Body mass index is 26 kg/m²  PHYSICAL EXAM:      General Appearance:   Alert, cooperative, no distress   HEENT:   Normocephalic, atraumatic, anicteric      Neck:  Supple, symmetrical, trachea midline   Lungs:   Clear to auscultation bilaterally; no rales, rhonchi or wheezing; respirations unlabored    Heart[de-identified]   Regular rate and rhythm; no murmur, rub, or gallop     Abdomen:   Soft, non-tender, non-distended; normal bowel sounds; no masses, no organomegaly    Genitalia:   Deferred    Rectal:   Deferred    Extremities:  No cyanosis, clubbing or edema    Pulses:  2+ and symmetric    Skin:  No jaundice, rashes, or lesions    Lymph nodes:  No palpable cervical lymphadenopathy        Lab Results:   No visits with results within 1 Day(s) from this visit  Latest known visit with results is:   Transcribe Orders on 03/16/2019   Component Date Value    Color, UA 03/16/2019 Yellow     Clarity, UA 03/16/2019 Clear     Specific Gravity, UA 03/16/2019 1 025     pH, UA 03/16/2019 6 0     Leukocytes, UA 03/16/2019 Negative     Nitrite, UA 03/16/2019 Negative     Protein, UA 03/16/2019 Negative     Glucose, UA 03/16/2019 Negative     Ketones, UA 03/16/2019 Trace*    Urobilinogen, UA 03/16/2019 0 2     Bilirubin, UA 03/16/2019 Negative     Blood, UA 03/16/2019 Negative          Radiology Results:   Xr Chest Pa & Lateral    Result Date: 3/21/2019  Narrative: CHEST INDICATION:   R05: Cough  COMPARISON:  5/23/2011 EXAM PERFORMED/VIEWS:  XR CHEST PA & LATERAL FINDINGS: Cardiomediastinal silhouette appears unremarkable  The lungs are clear  No pneumothorax or pleural effusion  Osseous structures appear within normal limits for patient age  Impression: No acute cardiopulmonary disease   Workstation performed: [id number]

## 2019-03-29 PROBLEM — R11.15 CYCLICAL VOMITING WITH NAUSEA: Status: ACTIVE | Noted: 2019-03-27

## 2019-05-21 ENCOUNTER — ANESTHESIA EVENT (OUTPATIENT)
Dept: GASTROENTEROLOGY | Facility: MEDICAL CENTER | Age: 24
End: 2019-05-21

## 2019-05-22 ENCOUNTER — ANESTHESIA (OUTPATIENT)
Dept: GASTROENTEROLOGY | Facility: MEDICAL CENTER | Age: 24
End: 2019-05-22

## 2019-05-22 ENCOUNTER — HOSPITAL ENCOUNTER (OUTPATIENT)
Dept: GASTROENTEROLOGY | Facility: MEDICAL CENTER | Age: 24
Setting detail: OUTPATIENT SURGERY
Discharge: HOME | End: 2019-05-22
Attending: INTERNAL MEDICINE | Admitting: INTERNAL MEDICINE
Payer: COMMERCIAL

## 2019-05-22 VITALS
DIASTOLIC BLOOD PRESSURE: 58 MMHG | OXYGEN SATURATION: 97 % | BODY MASS INDEX: 25.9 KG/M2 | TEMPERATURE: 98.6 F | RESPIRATION RATE: 16 BRPM | WEIGHT: 165 LBS | HEIGHT: 67 IN | HEART RATE: 61 BPM | SYSTOLIC BLOOD PRESSURE: 116 MMHG

## 2019-05-22 DIAGNOSIS — R11.15 CYCLICAL VOMITING, NOT INTRACTABLE: ICD-10-CM

## 2019-05-22 DIAGNOSIS — R17 JAUNDICE: ICD-10-CM

## 2019-05-22 DIAGNOSIS — R11.0 NAUSEA: ICD-10-CM

## 2019-05-22 PROCEDURE — 43239 EGD BIOPSY SINGLE/MULTIPLE: CPT | Performed by: INTERNAL MEDICINE

## 2019-05-22 PROCEDURE — 88305 TISSUE EXAM BY PATHOLOGIST: CPT | Performed by: PATHOLOGY

## 2019-05-22 PROCEDURE — NC001 PR NO CHARGE: Performed by: INTERNAL MEDICINE

## 2019-05-22 RX ORDER — PROPOFOL 10 MG/ML
INJECTION, EMULSION INTRAVENOUS AS NEEDED
Status: DISCONTINUED | OUTPATIENT
Start: 2019-05-22 | End: 2019-05-22 | Stop reason: SURG

## 2019-05-22 RX ORDER — SODIUM CHLORIDE 9 MG/ML
125 INJECTION, SOLUTION INTRAVENOUS CONTINUOUS
Status: DISCONTINUED | OUTPATIENT
Start: 2019-05-22 | End: 2019-05-26 | Stop reason: HOSPADM

## 2019-05-22 RX ORDER — ONDANSETRON 2 MG/ML
4 INJECTION INTRAMUSCULAR; INTRAVENOUS ONCE AS NEEDED
Status: DISCONTINUED | OUTPATIENT
Start: 2019-05-22 | End: 2019-05-26 | Stop reason: HOSPADM

## 2019-05-22 RX ADMIN — PROPOFOL 100 MG: 10 INJECTION, EMULSION INTRAVENOUS at 11:02

## 2019-05-22 RX ADMIN — SODIUM CHLORIDE 125 ML/HR: 0.9 INJECTION, SOLUTION INTRAVENOUS at 09:22

## 2019-05-22 RX ADMIN — PROPOFOL 50 MG: 10 INJECTION, EMULSION INTRAVENOUS at 11:05

## 2019-05-22 RX ADMIN — PROPOFOL 50 MG: 10 INJECTION, EMULSION INTRAVENOUS at 11:03

## 2019-05-22 RX ADMIN — LIDOCAINE HYDROCHLORIDE 40 MG: 20 INJECTION, SOLUTION INTRAVENOUS at 11:02

## 2019-06-10 ENCOUNTER — TELEPHONE (OUTPATIENT)
Dept: GASTROENTEROLOGY | Facility: CLINIC | Age: 24
End: 2019-06-10

## 2019-06-13 ENCOUNTER — OFFICE VISIT (OUTPATIENT)
Dept: FAMILY MEDICINE CLINIC | Facility: CLINIC | Age: 24
End: 2019-06-13
Payer: COMMERCIAL

## 2019-06-13 VITALS
SYSTOLIC BLOOD PRESSURE: 112 MMHG | OXYGEN SATURATION: 99 % | TEMPERATURE: 98.4 F | WEIGHT: 160.6 LBS | HEIGHT: 68 IN | BODY MASS INDEX: 24.34 KG/M2 | DIASTOLIC BLOOD PRESSURE: 60 MMHG | HEART RATE: 71 BPM

## 2019-06-13 DIAGNOSIS — R74.01 ELEVATED AST (SGOT): ICD-10-CM

## 2019-06-13 DIAGNOSIS — R17 ELEVATED BILIRUBIN: ICD-10-CM

## 2019-06-13 DIAGNOSIS — R76.8 POSITIVE AUTOANTIBODY SCREENING FOR CELIAC DISEASE: Primary | ICD-10-CM

## 2019-06-13 PROBLEM — R11.0 NAUSEA: Status: RESOLVED | Noted: 2019-03-27 | Resolved: 2019-06-13

## 2019-06-13 PROCEDURE — 3008F BODY MASS INDEX DOCD: CPT | Performed by: FAMILY MEDICINE

## 2019-06-13 PROCEDURE — 1036F TOBACCO NON-USER: CPT | Performed by: FAMILY MEDICINE

## 2019-06-13 PROCEDURE — 99213 OFFICE O/P EST LOW 20 MIN: CPT | Performed by: FAMILY MEDICINE

## 2019-06-18 DIAGNOSIS — R79.89 POSITIVE SEROLOGICAL TEST RESULT: Primary | ICD-10-CM

## 2021-03-19 ENCOUNTER — OFFICE VISIT (OUTPATIENT)
Dept: URGENT CARE | Facility: CLINIC | Age: 26
End: 2021-03-19
Payer: COMMERCIAL

## 2021-03-19 VITALS
HEART RATE: 81 BPM | HEIGHT: 67 IN | BODY MASS INDEX: 26.68 KG/M2 | SYSTOLIC BLOOD PRESSURE: 110 MMHG | RESPIRATION RATE: 20 BRPM | WEIGHT: 170 LBS | OXYGEN SATURATION: 96 % | DIASTOLIC BLOOD PRESSURE: 73 MMHG | TEMPERATURE: 98.2 F

## 2021-03-19 DIAGNOSIS — R21 RASH: Primary | ICD-10-CM

## 2021-03-19 PROCEDURE — G0382 LEV 3 HOSP TYPE B ED VISIT: HCPCS | Performed by: PHYSICIAN ASSISTANT

## 2021-03-19 PROCEDURE — S9083 URGENT CARE CENTER GLOBAL: HCPCS | Performed by: PHYSICIAN ASSISTANT

## 2021-03-19 RX ORDER — HYDROCORTISONE 25 MG/ML
LOTION TOPICAL 2 TIMES DAILY
Qty: 59 ML | Refills: 0 | Status: SHIPPED | OUTPATIENT
Start: 2021-03-19

## 2021-03-19 NOTE — PROGRESS NOTES
[address]        NAME: Francisco J Gray is a 26 y o  male  : 1995    MRN: [id number]  DATE: 2021  TIME: 5:38 PM    Assessment and Plan   Rash [R21]  1  Rash  hydrocortisone 2 5 % lotion         Patient Instructions     Apply hydrocortisone as directed  Encourage antihistamine, cool compresses, NSAID as needed  Recommend f/up with PCP/dermatology if no improvement  Follow up with PCP in 3-5 days  Proceed to  ER if symptoms worsen  Chief Complaint     Chief Complaint   Patient presents with    Rash     right arm rash, started 2 days ago, started at wrist the spread up arm, getting some rash on left arm  swollen painful, burning pain  no fevers or cold symptoms, no open areas at this time  no changes in diet or environement that would cause  tylenol, and wahing area, no other meds         History of Present Illness       Patient presents with complaint of rash of bilateral forearms for the past 2 days  Pt states that it began on his right wrist and has spread up to his elbow  He describes it as similar to sunburn, a red rash with a burning sensation  He states that it blanches when he presses on it  Pt states that his forearms feel swollen and tight  He denies any known provocation including sunburn, new skin products, new medication, detergents, etc  He reports taking tylenol and washing his forearms without improvement  He states that sometimes the redness nearly resolves but then comes back  Review of Systems   Review of Systems   Constitutional: Negative for chills and fever  HENT: Negative for ear pain and sore throat  Eyes: Negative for pain and visual disturbance  Respiratory: Negative for cough and shortness of breath  Cardiovascular: Negative for chest pain and palpitations  Gastrointestinal: Negative for abdominal pain and vomiting  Genitourinary: Negative for dysuria and hematuria  Musculoskeletal: Negative for arthralgias and back pain     Skin: Positive for rash  Negative for color change  Neurological: Negative for seizures and syncope  All other systems reviewed and are negative  Current Medications       Current Outpatient Medications:     hydrocortisone 2 5 % lotion, Apply topically 2 (two) times a day, Disp: 59 mL, Rfl: 0    ondansetron (ZOFRAN-ODT) 4 mg disintegrating tablet, Take 1 tablet (4 mg total) by mouth every 12 (twelve) hours as needed for nausea or vomiting (Patient not taking: Reported on 6/13/2019), Disp: 20 tablet, Rfl: 0    Current Allergies     Allergies as of 03/19/2021 - Reviewed 03/19/2021   Allergen Reaction Noted    Diphenhydramine Hives 04/21/2012    Promethazine Hives     Sulfa antibiotics Hives 04/21/2012            The following portions of the patient's history were reviewed and updated as appropriate: allergies, current medications, past family history, past medical history, past social history, past surgical history and problem list      Past Medical History:   Diagnosis Date    Abnormal LFTs (liver function tests) 12/30/2015    Resolved 8/1/2017    Acute lymphocytic leukemia ([facility] [address] ) 07/2005    Resolved September 2008       Past Surgical History:   Procedure Laterality Date    CHOLECYSTECTOMY      GALLBLADDER SURGERY  11/17/2011       Family History   Problem Relation Age of Onset    Heart disease Paternal Grandmother          Medications have been verified  Objective   /73   Pulse 81   Temp 98 2 °F (36 8 °C) (Tympanic)   Resp 20   Ht 5' 7" (1 702 m)   Wt 77 1 kg (170 lb)   SpO2 96%   BMI 26 63 kg/m²   No LMP for male patient  Physical Exam     Physical Exam  Vitals signs and nursing note reviewed  Constitutional:       General: He is not in acute distress  Appearance: Normal appearance  He is well-developed  He is not ill-appearing or diaphoretic  HENT:      Head: Normocephalic and atraumatic     Eyes:      Conjunctiva/sclera: Conjunctivae normal       Pupils: Pupils are equal, round, and reactive to light  Neck:      Musculoskeletal: Normal range of motion and neck supple  Cardiovascular:      Rate and Rhythm: Normal rate and regular rhythm  Heart sounds: Normal heart sounds  Pulmonary:      Effort: Pulmonary effort is normal  No respiratory distress  Breath sounds: Normal breath sounds  No stridor  No wheezing, rhonchi or rales  Lymphadenopathy:      Cervical: No cervical adenopathy  Skin:     General: Skin is warm and dry  Capillary Refill: Capillary refill takes less than 2 seconds  Findings: Erythema present  No rash  Comments: Erythema of bilateral forearms, primarily palmar aspect of right forearm and dorsum of left forearm; blanchable; NTTP; no wounds, induration, drainage, etc ; sensation intact; FAROM of joints; cap refill <2   Neurological:      Mental Status: He is alert and oriented to person, place, and time  Cranial Nerves: No cranial nerve deficit  Sensory: No sensory deficit  Psychiatric:         Behavior: Behavior normal          Thought Content:  Thought content normal